# Patient Record
Sex: MALE | Race: WHITE | NOT HISPANIC OR LATINO | Employment: UNEMPLOYED | URBAN - METROPOLITAN AREA
[De-identification: names, ages, dates, MRNs, and addresses within clinical notes are randomized per-mention and may not be internally consistent; named-entity substitution may affect disease eponyms.]

---

## 2019-12-30 ENCOUNTER — OFFICE VISIT (OUTPATIENT)
Dept: UROLOGY | Facility: CLINIC | Age: 19
End: 2019-12-30
Payer: COMMERCIAL

## 2019-12-30 VITALS
DIASTOLIC BLOOD PRESSURE: 74 MMHG | HEIGHT: 68 IN | RESPIRATION RATE: 20 BRPM | WEIGHT: 275 LBS | SYSTOLIC BLOOD PRESSURE: 128 MMHG | BODY MASS INDEX: 41.68 KG/M2 | HEART RATE: 71 BPM

## 2019-12-30 DIAGNOSIS — R68.82 DECREASED LIBIDO: Primary | ICD-10-CM

## 2019-12-30 LAB
ESTRADIOL: 32.7 PG/ML (ref 11–52.5)
FSH SERPL-ACNC: 1.6 MIU/ML (ref 0.7–10.8)
LH: 4.5 MIU/ML (ref 1.2–10.6)
SEX HORMONE BINDING GLOBULIN: 5.2 NMOL/L (ref 14.55–94.64)
TESTOST SERPL-MCNC: 134 NG/DL (ref 113–1065)

## 2019-12-30 PROCEDURE — 3008F BODY MASS INDEX DOCD: CPT | Mod: CPTII,S$GLB,, | Performed by: UROLOGY

## 2019-12-30 PROCEDURE — 99204 PR OFFICE/OUTPT VISIT, NEW, LEVL IV, 45-59 MIN: ICD-10-PCS | Mod: S$GLB,,, | Performed by: UROLOGY

## 2019-12-30 PROCEDURE — 3008F PR BODY MASS INDEX (BMI) DOCUMENTED: ICD-10-PCS | Mod: CPTII,S$GLB,, | Performed by: UROLOGY

## 2019-12-30 PROCEDURE — 99204 OFFICE O/P NEW MOD 45 MIN: CPT | Mod: S$GLB,,, | Performed by: UROLOGY

## 2019-12-30 RX ORDER — METHYLPHENIDATE HYDROCHLORIDE 30 MG/1
30 CAPSULE, EXTENDED RELEASE ORAL EVERY MORNING
COMMUNITY

## 2019-12-30 RX ORDER — ATENOLOL 25 MG/1
25 TABLET ORAL DAILY
COMMUNITY

## 2019-12-30 NOTE — PROGRESS NOTES
Subjective:       Patient ID: Wei Kraft is a 19 y.o. male.    Chief Complaint: Other (Patient is concerned about growth and size of penis)      HPI: 20 yo with genetic disorder concerned about size of his penis.  States has ok sex drive gets erections and has normal sensation with ejaculation      Past Medical History:   Past Medical History:   Diagnosis Date    Congenital velopharyngeal insufficiency     Development delay     Velo-cardio-facial syndrome        Past Surgical Historical:   Past Surgical History:   Procedure Laterality Date    CARDIAC SURGERY      penile reonstruction      PHARYNGEAL FLAP      TYMPANOSTOMY TUBE PLACEMENT          Medications:   Medication List with Changes/Refills   Current Medications    ATENOLOL (TENORMIN) 25 MG TABLET    Take 25 mg by mouth once daily.    METHYLPHENIDATE HCL (METADATE CD) 30 MG CR CAPSULE    Take 30 mg by mouth every morning.    POLYETHYLENE GLYCOL (GLYCOLAX) 17 GRAM PACKET    Take 17 g by mouth 2 (two) times daily.     SENNA-DOCUSATE (SENNOSIDES-DOCUSATE SODIUM) 8.6-50 MG PER TABLET    Take 1 tablet by mouth 2 (two) times daily.          Past Social History:   Social History     Socioeconomic History    Marital status: Single     Spouse name: Not on file    Number of children: Not on file    Years of education: Not on file    Highest education level: Not on file   Occupational History    Not on file   Social Needs    Financial resource strain: Not on file    Food insecurity:     Worry: Not on file     Inability: Not on file    Transportation needs:     Medical: Not on file     Non-medical: Not on file   Tobacco Use    Smoking status: Never Smoker   Substance and Sexual Activity    Alcohol use: No    Drug use: No    Sexual activity: Never   Lifestyle    Physical activity:     Days per week: Not on file     Minutes per session: Not on file    Stress: Not on file   Relationships    Social connections:     Talks on phone: Not on file      Gets together: Not on file     Attends [employer] service: Not on file     Active member of club or organization: Not on file     Attends meetings of clubs or organizations: Not on file     Relationship status: Not on file   Other Topics Concern    Not on file   Social History Narrative        PAST MEDICAL HISTORY: Term birth, 8 pounds, immunizations up-t    o-date, developmental milestones are delayed, no hospitalizations excep    t postop Tetralogy of Fallot repair.         PREVIOUS SURGERIES: Tetralogy of Fallot repair, penile scrotal    reconstruction, submucosal cleft palate repair and ear tubes.         FAMILY HISTORY: Significant for high blood pressure, diabetes,    migraines, high cholesterol, being overweight.         SOCIAL HISTORY: Reveals the patient lives at home with mom, no    siblings. Parents are . He has missed seven or eight days of [employer]    ool for his symptoms.       Allergies: Review of patient's allergies indicates:  No Known Allergies     Family History:   Family History   Problem Relation Age of Onset    Diabetes Other     Hyperlipidemia Other     Hypertension Other     Obesity Other         Review of Systems:  Review of Systems   Constitutional: Negative for activity change and appetite change.   HENT: Negative for congestion and dental problem.    Eyes: Negative for visual disturbance.   Respiratory: Negative for chest tightness and shortness of breath.    Cardiovascular: Negative for chest pain.   Gastrointestinal: Negative for abdominal distention and abdominal pain.   Genitourinary: Negative for decreased urine volume, difficulty urinating, discharge, dysuria, enuresis, flank pain, frequency, genital sores, hematuria, penile pain, penile swelling, scrotal swelling, testicular pain and urgency.   Musculoskeletal: Negative for back pain and neck pain.   Skin: Negative for color change.   Neurological: Negative for dizziness.   Hematological: Negative for adenopathy.    Psychiatric/Behavioral: Negative for agitation, behavioral problems and confusion.       Physical Exam:  Physical Exam   Nursing note and vitals reviewed.  Constitutional: He is oriented to person, place, and time. He appears well-developed and well-nourished.   HENT:   Head: Normocephalic.   Eyes: Pupils are equal, round, and reactive to light.   Neck: Normal range of motion. Neck supple.   Cardiovascular: Normal rate, regular rhythm and normal heart sounds.    Pulmonary/Chest: Effort normal and breath sounds normal.   Abdominal: Soft. Bowel sounds are normal.   Genitourinary: Rectum normal, prostate normal, testes normal and penis normal. Circumcised.         Musculoskeletal: Normal range of motion.   Neurological: He is alert and oriented to person, place, and time.   Skin: Skin is warm and dry.     Psychiatric: He has a normal mood and affect. His behavior is normal.       Assessment/Plan:     essentially normal exam will check hormones contact with result and arrange fu  Problem List Items Addressed This Visit     None      Visit Diagnoses     Decreased libido    -  Primary    Relevant Orders    Testosterone    Follicle stimulating hormone    Luteinizing hormone    Sex Hormone Binding Globulin    Estradiol

## 2020-01-08 ENCOUNTER — TELEPHONE (OUTPATIENT)
Dept: UROLOGY | Facility: CLINIC | Age: 20
End: 2020-01-08

## 2020-01-09 NOTE — TELEPHONE ENCOUNTER
----- Message from Rosalio Dowling MD sent at 1/2/2020  1:52 PM CST -----  Tell patient that tetosterone is in the low nl range.  All other labs look ok

## 2020-05-14 ENCOUNTER — TELEPHONE (OUTPATIENT)
Dept: UROLOGY | Facility: CLINIC | Age: 20
End: 2020-05-14

## 2020-05-14 NOTE — TELEPHONE ENCOUNTER
----- Message from Dora Bonds sent at 5/13/2020 11:26 AM CDT -----  Contact: emily dave  Pt wants to reschedule appt will like a call back [phone]

## 2020-06-03 ENCOUNTER — OFFICE VISIT (OUTPATIENT)
Dept: UROLOGY | Facility: CLINIC | Age: 20
End: 2020-06-03
Payer: COMMERCIAL

## 2020-06-03 VITALS — HEART RATE: 59 BPM | DIASTOLIC BLOOD PRESSURE: 54 MMHG | SYSTOLIC BLOOD PRESSURE: 106 MMHG

## 2020-06-03 DIAGNOSIS — E29.1 HYPOGONADISM MALE: Primary | ICD-10-CM

## 2020-06-03 LAB — TESTOST SERPL-MCNC: 139 NG/DL (ref 249–836)

## 2020-06-03 PROCEDURE — 99213 PR OFFICE/OUTPT VISIT, EST, LEVL III, 20-29 MIN: ICD-10-PCS | Mod: S$GLB,,, | Performed by: UROLOGY

## 2020-06-03 PROCEDURE — 99213 OFFICE O/P EST LOW 20 MIN: CPT | Mod: S$GLB,,, | Performed by: UROLOGY

## 2020-06-03 NOTE — PROGRESS NOTES
Subjective:       Patient ID: Wei Kraft is a 20 y.o. male.    Chief Complaint: Other (4 month fu )      HPI: 21 yo fu borderline low testosterone and concealed penis due to prominent fat pad       Past Medical History:   Past Medical History:   Diagnosis Date    Congenital velopharyngeal insufficiency     Development delay     Velo-cardio-facial syndrome        Past Surgical Historical:   Past Surgical History:   Procedure Laterality Date    CARDIAC SURGERY      penile reonstruction      PHARYNGEAL FLAP      TYMPANOSTOMY TUBE PLACEMENT          Medications:   Medication List with Changes/Refills   Current Medications    ATENOLOL (TENORMIN) 25 MG TABLET    Take 25 mg by mouth once daily.    METHYLPHENIDATE HCL (METADATE CD) 30 MG CR CAPSULE    Take 30 mg by mouth every morning.    POLYETHYLENE GLYCOL (GLYCOLAX) 17 GRAM PACKET    Take 17 g by mouth 2 (two) times daily.     SENNA-DOCUSATE (SENNOSIDES-DOCUSATE SODIUM) 8.6-50 MG PER TABLET    Take 1 tablet by mouth 2 (two) times daily.          Past Social History:   Social History     Socioeconomic History    Marital status: Single     Spouse name: Not on file    Number of children: Not on file    Years of education: Not on file    Highest education level: Not on file   Occupational History    Not on file   Social Needs    Financial resource strain: Not on file    Food insecurity:     Worry: Not on file     Inability: Not on file    Transportation needs:     Medical: Not on file     Non-medical: Not on file   Tobacco Use    Smoking status: Never Smoker   Substance and Sexual Activity    Alcohol use: No    Drug use: No    Sexual activity: Never   Lifestyle    Physical activity:     Days per week: Not on file     Minutes per session: Not on file    Stress: Not on file   Relationships    Social connections:     Talks on phone: Not on file     Gets together: Not on file     Attends Roman [employer] service: Not on file     Active member of club or  organization: Not on file     Attends meetings of clubs or organizations: Not on file     Relationship status: Not on file   Other Topics Concern    Not on file   Social History Narrative        PAST MEDICAL HISTORY: Term birth, 8 pounds, immunizations up-t    o-date, developmental milestones are delayed, no hospitalizations excep    t postop Tetralogy of Fallot repair.         PREVIOUS SURGERIES: Tetralogy of Fallot repair, penile scrotal    reconstruction, submucosal cleft palate repair and ear tubes.         FAMILY HISTORY: Significant for high blood pressure, diabetes,    migraines, high cholesterol, being overweight.         SOCIAL HISTORY: Reveals the patient lives at home with mom, no    siblings. Parents are . He has missed seven or eight days of [employer]    ool for his symptoms.       Allergies: Review of patient's allergies indicates:  No Known Allergies     Family History:   Family History   Problem Relation Age of Onset    Diabetes Other     Hyperlipidemia Other     Hypertension Other     Obesity Other         Review of Systems:  Review of Systems   Constitutional: Negative for activity change and appetite change.   HENT: Negative for congestion and dental problem.    Respiratory: Negative for chest tightness and shortness of breath.    Cardiovascular: Negative for chest pain.   Gastrointestinal: Negative for abdominal distention and abdominal pain.   Genitourinary: Negative for decreased urine volume, difficulty urinating, discharge, dysuria, enuresis, flank pain, frequency, genital sores, hematuria, penile pain, penile swelling, scrotal swelling, testicular pain and urgency.   Musculoskeletal: Negative for back pain and neck pain.   Neurological: Negative for dizziness.   Hematological: Negative for adenopathy.   Psychiatric/Behavioral: Negative for agitation, behavioral problems and confusion.       Physical Exam:  Physical Exam   Nursing note and vitals reviewed.  Constitutional: He is  oriented to person, place, and time. He appears well-developed and well-nourished.   HENT:   Head: Normocephalic.   Cardiovascular: Normal rate, regular rhythm and normal heart sounds.    Pulmonary/Chest: Effort normal and breath sounds normal.   Abdominal: Soft. Bowel sounds are normal.   Neurological: He is alert and oriented to person, place, and time.   Skin: Skin is warm and dry.         Assessment/Plan:     borderline hypogonadism--will check t and notify pt--tentatively fu in 6 months  Problem List Items Addressed This Visit     None

## 2020-06-12 ENCOUNTER — TELEPHONE (OUTPATIENT)
Dept: UROLOGY | Facility: CLINIC | Age: 20
End: 2020-06-12

## 2020-10-21 ENCOUNTER — OFFICE VISIT (OUTPATIENT)
Dept: UROLOGY | Facility: CLINIC | Age: 20
End: 2020-10-21
Payer: COMMERCIAL

## 2020-10-21 DIAGNOSIS — E29.1 HYPOGONADISM MALE: Primary | ICD-10-CM

## 2020-10-21 PROCEDURE — 99213 OFFICE O/P EST LOW 20 MIN: CPT | Mod: S$GLB,,, | Performed by: NURSE PRACTITIONER

## 2020-10-21 PROCEDURE — 99213 PR OFFICE/OUTPT VISIT, EST, LEVL III, 20-29 MIN: ICD-10-PCS | Mod: S$GLB,,, | Performed by: NURSE PRACTITIONER

## 2020-10-21 NOTE — PROGRESS NOTES
Subjective:       Patient ID: Wei Kraft is a 20 y.o. male.    Chief Complaint: Other (4 month fu )      HPI: 20-year-old male, patient Dr. Dowling, presents for 4 month evaluation.  Patient has a history of hypogonadism.  Last blood work done 4 months ago showed a borderline low testosterone.  That time patient not interested in testosterone.  Clomid was nocturia an option due to elevated estrogen levels too high.  Patient presents today stating he is doing good.  Patient states his energy level is good.  States he feels good.  Mother states that she feels the patient's doing well.  States she feels his energy level appears good to her.    Patient does have a history of a genetic disorder.    Patient denies any urinary complaints.  Denies any pain or burning urination.  States he has a good stream.  Denies difficulty voiding.  No other urinary complaints.  All other health problems appear stable at this time.       Past Medical History:   Past Medical History:   Diagnosis Date    Congenital velopharyngeal insufficiency     Development delay     Velo-cardio-facial syndrome        Past Surgical Historical:   Past Surgical History:   Procedure Laterality Date    CARDIAC SURGERY      penile reonstruction      PHARYNGEAL FLAP      TYMPANOSTOMY TUBE PLACEMENT          Medications:   Medication List with Changes/Refills   Current Medications    ATENOLOL (TENORMIN) 25 MG TABLET    Take 25 mg by mouth once daily.    METHYLPHENIDATE HCL (METADATE CD) 30 MG CR CAPSULE    Take 30 mg by mouth every morning.    POLYETHYLENE GLYCOL (GLYCOLAX) 17 GRAM PACKET    Take 17 g by mouth 2 (two) times daily.     SENNA-DOCUSATE (SENNOSIDES-DOCUSATE SODIUM) 8.6-50 MG PER TABLET    Take 1 tablet by mouth 2 (two) times daily.          Past Social History:   Social History     Socioeconomic History    Marital status: Single     Spouse name: Not on file    Number of children: Not on file    Years of education: Not on file     Highest education level: Not on file   Occupational History    Not on file   Social Needs    Financial resource strain: Not on file    Food insecurity     Worry: Not on file     Inability: Not on file    Transportation needs     Medical: Not on file     Non-medical: Not on file   Tobacco Use    Smoking status: Never Smoker   Substance and Sexual Activity    Alcohol use: No    Drug use: No    Sexual activity: Never   Lifestyle    Physical activity     Days per week: Not on file     Minutes per session: Not on file    Stress: Not on file   Relationships    Social connections     Talks on phone: Not on file     Gets together: Not on file     Attends Yazidism service: Not on file     Active member of club or organization: Not on file     Attends meetings of clubs or organizations: Not on file     Relationship status: Not on file   Other Topics Concern    Not on file   Social History Narrative        PAST MEDICAL HISTORY: Term birth, 8 pounds, immunizations up-t    o-date, developmental milestones are delayed, no hospitalizations excep    t postop Tetralogy of Fallot repair.         PREVIOUS SURGERIES: Tetralogy of Fallot repair, penile scrotal    reconstruction, submucosal cleft palate repair and ear tubes.         FAMILY HISTORY: Significant for high blood pressure, diabetes,    migraines, high cholesterol, being overweight.         SOCIAL HISTORY: Reveals the patient lives at home with mom, no    siblings. Parents are . He has missed seven or eight days of [employer]a    ool for his symptoms.       Allergies: Review of patient's allergies indicates:  No Known Allergies     Family History:   Family History   Problem Relation Age of Onset    Diabetes Other     Hyperlipidemia Other     Hypertension Other     Obesity Other         Review of Systems:  Review of Systems   Constitutional: Negative for activity change and appetite change.   HENT: Negative for congestion and dental problem.    Respiratory:  Negative for chest tightness and shortness of breath.    Cardiovascular: Negative for chest pain.   Gastrointestinal: Negative for abdominal distention and abdominal pain.   Genitourinary: Negative for decreased urine volume, difficulty urinating, discharge, dysuria, enuresis, flank pain, frequency, genital sores, hematuria, penile pain, penile swelling, scrotal swelling, testicular pain and urgency.   Musculoskeletal: Negative for back pain and neck pain.   Neurological: Negative for dizziness.   Hematological: Negative for adenopathy.   Psychiatric/Behavioral: Negative for agitation, behavioral problems and confusion.       Physical Exam:  Physical Exam  Vitals signs and nursing note reviewed.   Constitutional:       Appearance: He is well-developed.   HENT:      Head: Normocephalic.   Cardiovascular:      Rate and Rhythm: Normal rate and regular rhythm.      Heart sounds: Normal heart sounds.   Pulmonary:      Effort: Pulmonary effort is normal.      Breath sounds: Normal breath sounds.   Abdominal:      General: Bowel sounds are normal.      Palpations: Abdomen is soft.   Skin:     General: Skin is warm and dry.   Neurological:      Mental Status: He is alert and oriented to person, place, and time.         Assessment/Plan:   Hypogonadism:  Patient appears to be doing well.  We will continue to monitor.  Will likely check testosterone at next visit.    Patient follow-up in 6 months, sooner if needed.  Problem List Items Addressed This Visit     None      Visit Diagnoses     Hypogonadism male    -  Primary

## 2024-11-25 ENCOUNTER — TELEPHONE (OUTPATIENT)
Dept: FAMILY MEDICINE | Facility: CLINIC | Age: 24
End: 2024-11-25
Payer: COMMERCIAL

## 2024-11-25 NOTE — TELEPHONE ENCOUNTER
----- Message from Geraldine sent at 11/22/2024  2:00 PM CST -----  Patient is calling in regards to looking to establish care for next year..please call him back [phone]

## 2024-11-25 NOTE — TELEPHONE ENCOUNTER
----- Message from Geraldine sent at 11/22/2024  2:00 PM CST -----  Patient is calling in regards to looking to establish care for next year..please call him back [phone]